# Patient Record
Sex: FEMALE | Race: OTHER | Employment: PART TIME | URBAN - METROPOLITAN AREA
[De-identification: names, ages, dates, MRNs, and addresses within clinical notes are randomized per-mention and may not be internally consistent; named-entity substitution may affect disease eponyms.]

---

## 2022-08-13 ENCOUNTER — APPOINTMENT (OUTPATIENT)
Dept: CT IMAGING | Age: 27
End: 2022-08-13
Attending: PHYSICIAN ASSISTANT
Payer: COMMERCIAL

## 2022-08-13 ENCOUNTER — HOSPITAL ENCOUNTER (EMERGENCY)
Age: 27
Discharge: HOME | End: 2022-08-13
Attending: EMERGENCY MEDICINE
Payer: COMMERCIAL

## 2022-08-13 ENCOUNTER — APPOINTMENT (OUTPATIENT)
Dept: ULTRASOUND IMAGING | Age: 27
End: 2022-08-13
Attending: PHYSICIAN ASSISTANT
Payer: COMMERCIAL

## 2022-08-13 VITALS
HEIGHT: 64 IN | HEART RATE: 89 BPM | RESPIRATION RATE: 20 BRPM | BODY MASS INDEX: 50.02 KG/M2 | TEMPERATURE: 97.8 F | OXYGEN SATURATION: 100 % | WEIGHT: 293 LBS | DIASTOLIC BLOOD PRESSURE: 74 MMHG | SYSTOLIC BLOOD PRESSURE: 131 MMHG

## 2022-08-13 DIAGNOSIS — R51.9 ACUTE NONINTRACTABLE HEADACHE, UNSPECIFIED HEADACHE TYPE: Primary | ICD-10-CM

## 2022-08-13 DIAGNOSIS — D64.9 ANEMIA, UNSPECIFIED TYPE: ICD-10-CM

## 2022-08-13 LAB
ALBUMIN SERPL-MCNC: 3.4 G/DL (ref 3.5–5)
ALBUMIN/GLOB SERPL: 0.6 {RATIO} (ref 1.1–2.2)
ALP SERPL-CCNC: 80 U/L (ref 45–117)
ALT SERPL-CCNC: 17 U/L (ref 12–78)
ANION GAP SERPL CALC-SCNC: 6 MMOL/L (ref 5–15)
APPEARANCE UR: ABNORMAL
AST SERPL-CCNC: 15 U/L (ref 15–37)
BACTERIA URNS QL MICRO: NEGATIVE /HPF
BASOPHILS # BLD: 0 K/UL (ref 0–0.1)
BASOPHILS NFR BLD: 0 % (ref 0–1)
BILIRUB SERPL-MCNC: 0.2 MG/DL (ref 0.2–1)
BILIRUB UR QL: NEGATIVE
BUN SERPL-MCNC: 12 MG/DL (ref 6–20)
BUN/CREAT SERPL: 12 (ref 12–20)
CALCIUM SERPL-MCNC: 9.6 MG/DL (ref 8.5–10.1)
CHLORIDE SERPL-SCNC: 104 MMOL/L (ref 97–108)
CO2 SERPL-SCNC: 27 MMOL/L (ref 21–32)
COLOR UR: ABNORMAL
COMMENT, HOLDF: NORMAL
CREAT SERPL-MCNC: 0.98 MG/DL (ref 0.55–1.02)
DIFFERENTIAL METHOD BLD: ABNORMAL
EOSINOPHIL # BLD: 0.1 K/UL (ref 0–0.4)
EOSINOPHIL NFR BLD: 1 % (ref 0–7)
EPITH CASTS URNS QL MICRO: ABNORMAL /LPF
ERYTHROCYTE [DISTWIDTH] IN BLOOD BY AUTOMATED COUNT: 16.6 % (ref 11.5–14.5)
GLOBULIN SER CALC-MCNC: 5.6 G/DL (ref 2–4)
GLUCOSE SERPL-MCNC: 91 MG/DL (ref 65–100)
GLUCOSE UR STRIP.AUTO-MCNC: NEGATIVE MG/DL
HCG UR QL: NEGATIVE
HCT VFR BLD AUTO: 36.1 % (ref 35–47)
HGB BLD-MCNC: 10.7 G/DL (ref 11.5–16)
HGB UR QL STRIP: ABNORMAL
HYALINE CASTS URNS QL MICRO: ABNORMAL /LPF (ref 0–5)
IMM GRANULOCYTES # BLD AUTO: 0 K/UL (ref 0–0.04)
IMM GRANULOCYTES NFR BLD AUTO: 0 % (ref 0–0.5)
KETONES UR QL STRIP.AUTO: ABNORMAL MG/DL
LEUKOCYTE ESTERASE UR QL STRIP.AUTO: ABNORMAL
LYMPHOCYTES # BLD: 2 K/UL (ref 0.8–3.5)
LYMPHOCYTES NFR BLD: 19 % (ref 12–49)
MCH RBC QN AUTO: 21.6 PG (ref 26–34)
MCHC RBC AUTO-ENTMCNC: 29.6 G/DL (ref 30–36.5)
MCV RBC AUTO: 72.9 FL (ref 80–99)
MONOCYTES # BLD: 0.7 K/UL (ref 0–1)
MONOCYTES NFR BLD: 6 % (ref 5–13)
NEUTS SEG # BLD: 7.8 K/UL (ref 1.8–8)
NEUTS SEG NFR BLD: 74 % (ref 32–75)
NITRITE UR QL STRIP.AUTO: NEGATIVE
NRBC # BLD: 0 K/UL (ref 0–0.01)
NRBC BLD-RTO: 0 PER 100 WBC
PH UR STRIP: 5.5 [PH] (ref 5–8)
PLATELET # BLD AUTO: 517 K/UL (ref 150–400)
PMV BLD AUTO: 8.8 FL (ref 8.9–12.9)
POTASSIUM SERPL-SCNC: 4.1 MMOL/L (ref 3.5–5.1)
PROT SERPL-MCNC: 9 G/DL (ref 6.4–8.2)
PROT UR STRIP-MCNC: ABNORMAL MG/DL
RBC # BLD AUTO: 4.95 M/UL (ref 3.8–5.2)
RBC #/AREA URNS HPF: ABNORMAL /HPF (ref 0–5)
SAMPLES BEING HELD,HOLD: NORMAL
SODIUM SERPL-SCNC: 137 MMOL/L (ref 136–145)
SP GR UR REFRACTOMETRY: 1.03 (ref 1–1.03)
UR CULT HOLD, URHOLD: NORMAL
UROBILINOGEN UR QL STRIP.AUTO: 0.2 EU/DL (ref 0.2–1)
WBC # BLD AUTO: 10.5 K/UL (ref 3.6–11)
WBC URNS QL MICRO: ABNORMAL /HPF (ref 0–4)

## 2022-08-13 PROCEDURE — 81001 URINALYSIS AUTO W/SCOPE: CPT

## 2022-08-13 PROCEDURE — 76830 TRANSVAGINAL US NON-OB: CPT

## 2022-08-13 PROCEDURE — 85025 COMPLETE CBC W/AUTO DIFF WBC: CPT

## 2022-08-13 PROCEDURE — 81025 URINE PREGNANCY TEST: CPT

## 2022-08-13 PROCEDURE — 74011250636 HC RX REV CODE- 250/636: Performed by: PHYSICIAN ASSISTANT

## 2022-08-13 PROCEDURE — 80053 COMPREHEN METABOLIC PANEL: CPT

## 2022-08-13 PROCEDURE — 70450 CT HEAD/BRAIN W/O DYE: CPT

## 2022-08-13 PROCEDURE — 96374 THER/PROPH/DIAG INJ IV PUSH: CPT

## 2022-08-13 PROCEDURE — 76856 US EXAM PELVIC COMPLETE: CPT

## 2022-08-13 PROCEDURE — 36415 COLL VENOUS BLD VENIPUNCTURE: CPT

## 2022-08-13 PROCEDURE — 99284 EMERGENCY DEPT VISIT MOD MDM: CPT

## 2022-08-13 RX ORDER — LANOLIN ALCOHOL/MO/W.PET/CERES
325 CREAM (GRAM) TOPICAL EVERY OTHER DAY
Qty: 15 TABLET | Refills: 0 | Status: SHIPPED | OUTPATIENT
Start: 2022-08-13 | End: 2022-09-12

## 2022-08-13 RX ORDER — KETOROLAC TROMETHAMINE 30 MG/ML
15 INJECTION, SOLUTION INTRAMUSCULAR; INTRAVENOUS
Status: COMPLETED | OUTPATIENT
Start: 2022-08-13 | End: 2022-08-13

## 2022-08-13 RX ADMIN — KETOROLAC TROMETHAMINE 15 MG: 30 INJECTION, SOLUTION INTRAMUSCULAR at 16:48

## 2022-08-13 NOTE — ED TRIAGE NOTES
Patient is coming in for headache for months. Recently started with nausea and dizziness and has those symptoms when she has ovarian cyst. Patient is having some mild abdominal discomfort.

## 2022-08-13 NOTE — DISCHARGE INSTRUCTIONS
Return for new or worsening symptoms as discussed. Your work-up today was overall reassuring. Your hemoglobin, or iron count, was slightly low which could be contributing to some fatigue. We have written you an iron supplement to hopefully bring it up some. Keep your appointment with your gynecologist.  Call and make a follow-up with a primary care group for an appointment as a new patient-some groups to call that are large and usually excepting new patients are [facility], [facility], and [facility] internal medicine. Results:    Labs:  Recent Results (from the past 24 hour(s))   CBC WITH AUTOMATED DIFF    Collection Time: 08/13/22  2:17 PM   Result Value Ref Range    WBC 10.5 3.6 - 11.0 K/uL    RBC 4.95 3.80 - 5.20 M/uL    HGB 10.7 (L) 11.5 - 16.0 g/dL    HCT 36.1 35.0 - 47.0 %    MCV 72.9 (L) 80.0 - 99.0 FL    MCH 21.6 (L) 26.0 - 34.0 PG    MCHC 29.6 (L) 30.0 - 36.5 g/dL    RDW 16.6 (H) 11.5 - 14.5 %    PLATELET 325 (H) 169 - 400 K/uL    MPV 8.8 (L) 8.9 - 12.9 FL    NRBC 0.0 0  WBC    ABSOLUTE NRBC 0.00 0.00 - 0.01 K/uL    NEUTROPHILS 74 32 - 75 %    LYMPHOCYTES 19 12 - 49 %    MONOCYTES 6 5 - 13 %    EOSINOPHILS 1 0 - 7 %    BASOPHILS 0 0 - 1 %    IMMATURE GRANULOCYTES 0 0.0 - 0.5 %    ABS. NEUTROPHILS 7.8 1.8 - 8.0 K/UL    ABS. LYMPHOCYTES 2.0 0.8 - 3.5 K/UL    ABS. MONOCYTES 0.7 0.0 - 1.0 K/UL    ABS. EOSINOPHILS 0.1 0.0 - 0.4 K/UL    ABS. BASOPHILS 0.0 0.0 - 0.1 K/UL    ABS. IMM.  GRANS. 0.0 0.00 - 0.04 K/UL    DF AUTOMATED     METABOLIC PANEL, COMPREHENSIVE    Collection Time: 08/13/22  2:17 PM   Result Value Ref Range    Sodium 137 136 - 145 mmol/L    Potassium 4.1 3.5 - 5.1 mmol/L    Chloride 104 97 - 108 mmol/L    CO2 27 21 - 32 mmol/L    Anion gap 6 5 - 15 mmol/L    Glucose 91 65 - 100 mg/dL    BUN 12 6 - 20 MG/DL    Creatinine 0.98 0.55 - 1.02 MG/DL    BUN/Creatinine ratio 12 12 - 20      GFR est AA >60 >60 ml/min/1.73m2    GFR est non-AA >60 >60 ml/min/1.73m2    Calcium 9.6 8.5 - 10.1 MG/DL    Bilirubin, total 0.2 0.2 - 1.0 MG/DL    ALT (SGPT) 17 12 - 78 U/L    AST (SGOT) 15 15 - 37 U/L    Alk. phosphatase 80 45 - 117 U/L    Protein, total 9.0 (H) 6.4 - 8.2 g/dL    Albumin 3.4 (L) 3.5 - 5.0 g/dL    Globulin 5.6 (H) 2.0 - 4.0 g/dL    A-G Ratio 0.6 (L) 1.1 - 2.2     SAMPLES BEING HELD    Collection Time: 08/13/22  2:17 PM   Result Value Ref Range    SAMPLES BEING HELD 1 RED 1BLUE      COMMENT        Add-on orders for these samples will be processed based on acceptable specimen integrity and analyte stability, which may vary by analyte. URINALYSIS W/MICROSCOPIC    Collection Time: 08/13/22  3:06 PM   Result Value Ref Range    Color YELLOW/STRAW      Appearance CLOUDY (A) CLEAR      Specific gravity 1.026 1.003 - 1.030      pH (UA) 5.5 5.0 - 8.0      Protein TRACE (A) NEG mg/dL    Glucose Negative NEG mg/dL    Ketone TRACE (A) NEG mg/dL    Bilirubin Negative NEG      Blood LARGE (A) NEG      Urobilinogen 0.2 0.2 - 1.0 EU/dL    Nitrites Negative NEG      Leukocyte Esterase SMALL (A) NEG      WBC 5-10 0 - 4 /hpf    RBC  0 - 5 /hpf    Epithelial cells MODERATE (A) FEW /lpf    Bacteria Negative NEG /hpf    Hyaline cast 2-5 0 - 5 /lpf   URINE CULTURE HOLD SAMPLE    Collection Time: 08/13/22  3:06 PM    Specimen: Serum; Urine   Result Value Ref Range    Urine culture hold        Urine on hold in Microbiology dept for 2 days. If unpreserved urine is submitted, it cannot be used for addtional testing after 24 hours, recollection will be required. HCG URINE, QL. - POC    Collection Time: 08/13/22  3:09 PM   Result Value Ref Range    Pregnancy test,urine (POC) Negative NEG         Imaging:  CT HEAD WO CONT    Result Date: 8/13/2022  CLINICAL HISTORY: headache INDICATION: headache COMPARISON: None. CT dose reduction was achieved through use of a standardized protocol tailored for this examination and automatic exposure control for dose modulation.  TECHNIQUE: Serial axial images with a collimation of 5 mm were obtained from the skull base through the vertex  FINDINGS: The sulci and ventricles are within normal limits for patient age. There is no evidence of an acute infarction, hemorrhage, or mass-effect. There is no evidence of midline shift or hydrocephalus. Posterior fossa structures are unremarkable. No extra-axial collections are seen. Mastoid air cells are well pneumatized and clear. There is no evidence of depressed skull fractures of soft tissue swelling. No acute intracranial process. US TRANSVAGINAL    Result Date: 2022  EXAM:  US TRANSVAGINAL, US PELV NON OBS INDICATION: Pelvic pain, nausea, dizziness, and headache. LMP on 5/15/2022. . COMPARISON: None. TECHNIQUE: Transabdominal and endovaginal ultrasound of the female pelvis. (2 separate studies reported together) Transvaginal scanning was performed for better evaluation of ovaries and endometrium. [place] FINDINGS: Transabdominal ultrasound: Urinary bladder: Incomplete distention, limited evaluation. Uterus: measures 7.5 x 6.5 x 6.0 cm, which is within normal limits. There is no sonographic myometrial abnormality. Endometrium: 1.9 cm in thickness. No abnormal vascularity. Right ovary: 4.2 x 4.4 x 3.2 cm. Blood flow is present in the right ovary. Physiologic anechoic follicle. No right adnexal mass or cyst. Left ovary: 4.4 x 4.4 x 3.9 cm. Blood flow is present in the left ovary. Physiologic anechoic follicle. No adnexal cyst or mass. Free fluid: None. Endovaginal ultrasound: Urinary bladder: Nondistended. Uterus: measures 10.2 x 4.8 x 4.8 cm, which is within normal limits. There is no sonographic myometrial abnormality. There are small cervical nabothian cysts. Endometrium: 19 mm in thickness. Homogeneous. Right ovary: 2.7 x 2.6 x 2.5 cm. Blood flow is present in the right ovary. No right adnexal mass or cyst. Left ovary: 5.3 x 3.4 x 3.2 cm. Blood flow is present in the left ovary.  Physiologic anechoic benign cyst measures 3.0 x 2.9 x 2.8 Free fluid: None. No tenderness during scanning. Normal sonographic appearance of the female pelvis. US PELV NON OBS    Result Date: 2022  EXAM:  US TRANSVAGINAL, US PELV NON OBS INDICATION: Pelvic pain, nausea, dizziness, and headache. LMP on 5/15/2022. . COMPARISON: None. TECHNIQUE: Transabdominal and endovaginal ultrasound of the female pelvis. (2 separate studies reported together) Transvaginal scanning was performed for better evaluation of ovaries and endometrium. [facility] FINDINGS: Transabdominal ultrasound: Urinary bladder: Incomplete distention, limited evaluation. Uterus: measures 7.5 x 6.5 x 6.0 cm, which is within normal limits. There is no sonographic myometrial abnormality. Endometrium: 1.9 cm in thickness. No abnormal vascularity. Right ovary: 4.2 x 4.4 x 3.2 cm. Blood flow is present in the right ovary. Physiologic anechoic follicle. No right adnexal mass or cyst. Left ovary: 4.4 x 4.4 x 3.9 cm. Blood flow is present in the left ovary. Physiologic anechoic follicle. No adnexal cyst or mass. Free fluid: None. Endovaginal ultrasound: Urinary bladder: Nondistended. Uterus: measures 10.2 x 4.8 x 4.8 cm, which is within normal limits. There is no sonographic myometrial abnormality. There are small cervical nabothian cysts. Endometrium: 19 mm in thickness. Homogeneous. Right ovary: 2.7 x 2.6 x 2.5 cm. Blood flow is present in the right ovary. No right adnexal mass or cyst. Left ovary: 5.3 x 3.4 x 3.2 cm. Blood flow is present in the left ovary. Physiologic anechoic benign cyst measures 3.0 x 2.9 x 2.8 Free fluid: None. No tenderness during scanning. Normal sonographic appearance of the female pelvis.

## 2022-08-13 NOTE — ED PROVIDER NOTES
33year old F presenting for 'I've been having a headache now for a few months. \" Initially would go away with excedrin, now more persistent. Last week started with nausea, \"a fullness feeling in my stomach. \"  Notes hx ovarian cysts. Pt notes that headache was initially less frequent, now constant for 3 weeks. No focal weakness or numbness. Not getting worse, just more persistent. Frontal and left sided, pressure. Sometimes feels lightheaded. Minimal pain. + photophobia at times. No vision changes. Saw [facility] on 8/9 - dx with sinusitis and tension HA. Note abdominal discomfort that she describes as \"fullness and stiffness, not pain. \"  Negative test at home. Notes irregular menses. Notes abdominal pain was generalized, now more right sided, started last week. \"Just discomfort. \"  + frequency. No dysuria. No abnormal vaginal bleeding or discharge. LMP: back in May, not uncommon as they tend to be irregular. No diarrhea or constipation. PMHx: asthma childhood  PSx: denies  Social: rare cigarettes. No alcohol. No drugs. Works at [employer]    The history is provided by the patient. Headache   Associated symptoms include dizziness and nausea. Pertinent negatives include no fever, no shortness of breath and no vomiting. Nausea   Associated symptoms include headaches and headaches. Pertinent negatives include no fever. Dizziness  Associated symptoms include headaches and nausea. Pertinent negatives include no shortness of breath, no chest pain and no vomiting. Past Medical History:   Diagnosis Date    Acne cystica 10/28/2015    Asthma in remission 10/28/2015    BMI 40.0-44.9, adult ([facility] [address] 10/28/2015    Encounter for initial prescription of contraceptive pills 6/15/2016    Family history of diabetes mellitus in grandmother 10/28/2015       No past surgical history on file.       Family History:   Problem Relation Age of Onset    Diabetes Maternal Grandmother        Social History     Socioeconomic History    Marital status: SINGLE     Spouse name: Not on file    Number of children: Not on file    Years of education: Not on file    Highest education level: Not on file   Occupational History    Not on file   Tobacco Use    Smoking status: Never    Smokeless tobacco: Never   Substance and Sexual Activity    Alcohol use: No     Alcohol/week: 0.0 standard drinks    Drug use: No    Sexual activity: Never   Other Topics Concern    Not on file   Social History Narrative    Not on file     Social Determinants of Health     Financial Resource Strain: Not on file   Food Insecurity: Not on file   Transportation Needs: Not on file   Physical Activity: Not on file   Stress: Not on file   Social Connections: Not on file   Intimate Partner Violence: Not on file   Housing Stability: Not on file         ALLERGIES: Sulfa (sulfonamide antibiotics)    Review of Systems   Constitutional:  Negative for fever. HENT:  Negative for facial swelling. Respiratory:  Negative for shortness of breath. Cardiovascular:  Negative for chest pain. Gastrointestinal:  Positive for nausea. Negative for vomiting. Genitourinary:  Positive for frequency. Negative for dysuria, vaginal bleeding and vaginal discharge. Musculoskeletal:  Negative for neck stiffness. Skin:  Negative for wound. Neurological:  Positive for dizziness and headaches. Negative for syncope. All other systems reviewed and are negative. Vitals:    08/13/22 1328   BP: (!) 160/91   Pulse: (!) 103   Resp: 20   Temp: 97.8 °F (36.6 °C)   SpO2: 100%   Weight: 145 kg (319 lb 10.7 oz)   Height: 5' 4\" (1.626 m)            Physical Exam  Vitals and nursing note reviewed. Constitutional:       General: She is not in acute distress. Appearance: She is well-developed. Comments: Pleasant, well-appearing, no distress   HENT:      Head: Normocephalic and atraumatic.       Right Ear: External ear normal.      Left Ear: External ear normal.   Eyes:      General: No scleral icterus. Extraocular Movements: Extraocular movements intact. Conjunctiva/sclera: Conjunctivae normal.      Pupils: Pupils are equal, round, and reactive to light. Neck:      Trachea: No tracheal deviation. Cardiovascular:      Rate and Rhythm: Normal rate and regular rhythm. Heart sounds: Normal heart sounds. No murmur heard. No friction rub. No gallop. Pulmonary:      Effort: Pulmonary effort is normal. No respiratory distress. Breath sounds: Normal breath sounds. No stridor. No wheezing. Abdominal:      General: There is no distension. Palpations: Abdomen is soft. Tenderness: There is no abdominal tenderness. Comments: Soft, benign, nontender   Musculoskeletal:         General: Normal range of motion. Cervical back: Neck supple. Skin:     General: Skin is warm and dry. Neurological:      Mental Status: She is alert and oriented to person, place, and time. Cranial Nerves: No cranial nerve deficit (2 through 12 tested and intact). Psychiatric:         Behavior: Behavior normal.        MDM  Number of Diagnoses or Management Options  Acute nonintractable headache, unspecified headache type  Anemia, unspecified type  Diagnosis management comments: 81-year-old female presenting to the ED for multiple complaints, notes a headache for couple of months, also with about 2 weeks of what she describes as an abdominal heaviness, concerned it may be a recurrent ovarian cyst.  Abdomen is soft and benign without any focal tenderness. Labs overall reassuring. Patient did endorse some fatigue and her hemoglobin today is 10.5, states that she has been told she is anemic in the past, will start iron supplementation.   Patient has an appointment with her gynecologist next week, encouraged her to keep this but did also discussed need for primary care follow-up for headache, other general health concerns, patient voiced understanding.        Amount and/or Complexity of Data Reviewed  Clinical lab tests: ordered and reviewed  Tests in the radiology section of CPT®: reviewed and ordered  Discuss the patient with other providers: yes (Dr. Yadi Mays ED attending)           Procedures

## 2023-05-22 RX ORDER — ALBUTEROL SULFATE 90 UG/1
1 AEROSOL, METERED RESPIRATORY (INHALATION) EVERY 6 HOURS PRN
COMMUNITY
Start: 2015-10-28

## 2023-05-22 RX ORDER — CYCLOBENZAPRINE HCL 10 MG
10 TABLET ORAL 3 TIMES DAILY PRN
COMMUNITY
Start: 2016-03-17

## 2023-05-22 RX ORDER — PREDNISONE 10 MG/1
TABLET ORAL
COMMUNITY
Start: 2016-03-17

## 2023-05-22 RX ORDER — ERYTHROMYCIN AND BENZOYL PEROXIDE 30; 50 MG/G; MG/G
GEL TOPICAL 2 TIMES DAILY
COMMUNITY
Start: 2015-10-28

## 2023-05-22 RX ORDER — NORGESTIMATE AND ETHINYL ESTRADIOL 7DAYSX3 28
1 KIT ORAL DAILY
COMMUNITY
Start: 2016-06-15